# Patient Record
Sex: FEMALE | Race: WHITE | Employment: UNEMPLOYED | URBAN - METROPOLITAN AREA
[De-identification: names, ages, dates, MRNs, and addresses within clinical notes are randomized per-mention and may not be internally consistent; named-entity substitution may affect disease eponyms.]

---

## 2019-12-26 ENCOUNTER — HOSPITAL ENCOUNTER (OUTPATIENT)
Age: 34
Discharge: HOME | End: 2019-12-26
Attending: FAMILY MEDICINE
Payer: COMMERCIAL

## 2019-12-26 VITALS
OXYGEN SATURATION: 100 % | SYSTOLIC BLOOD PRESSURE: 131 MMHG | DIASTOLIC BLOOD PRESSURE: 76 MMHG | HEART RATE: 89 BPM | RESPIRATION RATE: 18 BRPM | TEMPERATURE: 99 F

## 2019-12-26 DIAGNOSIS — J10.1 INFLUENZA A: Primary | ICD-10-CM

## 2019-12-26 DIAGNOSIS — B00.1 HERPES LABIALIS WITHOUT COMPLICATION: ICD-10-CM

## 2019-12-26 PROCEDURE — 87502 INFLUENZA DNA AMP PROBE: CPT | Performed by: FAMILY MEDICINE

## 2019-12-26 PROCEDURE — 99204 OFFICE O/P NEW MOD 45 MIN: CPT

## 2019-12-26 PROCEDURE — 99203 OFFICE O/P NEW LOW 30 MIN: CPT

## 2019-12-26 RX ORDER — CODEINE PHOSPHATE AND GUAIFENESIN 10; 100 MG/5ML; MG/5ML
10 SOLUTION ORAL NIGHTLY PRN
Qty: 50 ML | Refills: 0 | Status: SHIPPED | OUTPATIENT
Start: 2019-12-26 | End: 2019-12-31

## 2019-12-26 RX ORDER — UBIDECARENONE 75 MG
250 CAPSULE ORAL DAILY
COMMUNITY
End: 2020-08-11

## 2019-12-26 RX ORDER — BENZONATATE 100 MG/1
100 CAPSULE ORAL 3 TIMES DAILY PRN
Qty: 15 CAPSULE | Refills: 0 | Status: SHIPPED | OUTPATIENT
Start: 2019-12-26 | End: 2019-12-31

## 2019-12-26 RX ORDER — MESALAMINE 1.2 G/1
TABLET, DELAYED RELEASE ORAL DAILY
COMMUNITY

## 2019-12-26 RX ORDER — CITALOPRAM 10 MG/1
10 TABLET ORAL DAILY
COMMUNITY

## 2019-12-26 RX ORDER — IBUPROFEN 800 MG
TABLET ORAL
COMMUNITY

## 2019-12-26 RX ORDER — VALACYCLOVIR HYDROCHLORIDE 1 G/1
2 TABLET, FILM COATED ORAL 2 TIMES DAILY
Qty: 4 TABLET | Refills: 0 | Status: SHIPPED | OUTPATIENT
Start: 2019-12-26 | End: 2019-12-27

## 2019-12-26 NOTE — ED PROVIDER NOTES
Patient Seen in: [id number] [facility] - [place]      History   No chief complaint on file.     Stated Complaint: flu like symptoms    HPI  26 yo F with Flu like symptoms that started 3.5 days ago and her  is here with her having developed symptoms of secondary infection.  TMs are normal - mild middle ear effusion+, Nares normal  NECK: FROM, supple, anterior cervical LAD +   LUNGS: CTAB, no RRW  CV: RRR  ABD: not distended  NEURO: Alert and oriented to person place and time  GAIT: Normal       ED [id number]  [phone]    In 1 week  As needed        Medications Prescribed:  Discharge Medication List as of 12/26/2019 12:29 PM    START taking these medications    valACYclovir HCl 1 G Oral Tab  Take 2 tablets (2,000 mg total) by mouth 2 (two) times daily

## 2019-12-29 ENCOUNTER — HOSPITAL ENCOUNTER (OUTPATIENT)
Age: 34
Discharge: HOME | End: 2019-12-29
Attending: FAMILY MEDICINE
Payer: COMMERCIAL

## 2019-12-29 VITALS
TEMPERATURE: 99 F | OXYGEN SATURATION: 97 % | HEART RATE: 79 BPM | DIASTOLIC BLOOD PRESSURE: 78 MMHG | RESPIRATION RATE: 18 BRPM | SYSTOLIC BLOOD PRESSURE: 133 MMHG

## 2019-12-29 DIAGNOSIS — J22 LOWER RESPIRATORY INFECTION (E.G., BRONCHITIS, PNEUMONIA, PNEUMONITIS, PULMONITIS): Primary | ICD-10-CM

## 2019-12-29 PROCEDURE — 99214 OFFICE O/P EST MOD 30 MIN: CPT

## 2019-12-29 PROCEDURE — 99213 OFFICE O/P EST LOW 20 MIN: CPT

## 2019-12-29 RX ORDER — ALBUTEROL SULFATE 90 UG/1
2 AEROSOL, METERED RESPIRATORY (INHALATION) EVERY 6 HOURS PRN
Qty: 1 INHALER | Refills: 0 | Status: SHIPPED | OUTPATIENT
Start: 2019-12-29 | End: 2020-01-28

## 2019-12-29 RX ORDER — PREDNISONE 20 MG/1
40 TABLET ORAL DAILY
Qty: 10 TABLET | Refills: 0 | Status: SHIPPED | OUTPATIENT
Start: 2019-12-29 | End: 2020-01-03

## 2019-12-29 RX ORDER — AMOXICILLIN AND CLAVULANATE POTASSIUM 875; 125 MG/1; MG/1
1 TABLET, FILM COATED ORAL 2 TIMES DAILY
Qty: 14 TABLET | Refills: 0 | Status: SHIPPED | OUTPATIENT
Start: 2019-12-29 | End: 2020-01-05

## 2019-12-29 NOTE — ED PROVIDER NOTES
Patient Seen [place]: [id number] [facility] [place]      History   Patient presents with:  Cough/URI    Stated Complaint: Cough    HPI    39-year-old female returns to the immediate care today after she was seen and diagnosed with influenza A about 3 days a benign. Ears: normal TM's and external ear canals both ears  Nose: Nares normal. Septum midline. Mucosa normal. No drainage or sinus tenderness. . No nasal flaring  Throat: lips, mucosa, and tongue normal; teeth and gums normal  Neck: no adenopathy, supple